# Patient Record
Sex: MALE | Race: WHITE | Employment: FULL TIME | URBAN - METROPOLITAN AREA
[De-identification: names, ages, dates, MRNs, and addresses within clinical notes are randomized per-mention and may not be internally consistent; named-entity substitution may affect disease eponyms.]

---

## 2017-01-20 PROCEDURE — 81001 URINALYSIS AUTO W/SCOPE: CPT | Performed by: FAMILY MEDICINE

## 2017-02-20 ENCOUNTER — OCC HEALTH (OUTPATIENT)
Dept: OCCUPATIONAL MEDICINE | Age: 62
End: 2017-02-20
Attending: PHYSICIAN ASSISTANT

## 2018-01-13 PROCEDURE — 81001 URINALYSIS AUTO W/SCOPE: CPT | Performed by: FAMILY MEDICINE

## 2019-03-18 PROBLEM — Z00.00 ROUTINE GENERAL MEDICAL EXAMINATION AT A HEALTH CARE FACILITY: Status: ACTIVE | Noted: 2019-03-18

## 2019-03-18 PROBLEM — I70.0 ATHEROSCLEROSIS OF AORTA: Status: ACTIVE | Noted: 2019-03-18

## 2019-03-18 PROBLEM — K40.90 RIGHT INGUINAL HERNIA: Status: ACTIVE | Noted: 2019-03-18

## 2019-03-18 PROBLEM — F17.290 CIGAR SMOKER: Status: ACTIVE | Noted: 2019-03-18

## 2019-03-18 PROBLEM — R91.8 PULMONARY NODULES: Status: ACTIVE | Noted: 2019-03-18

## 2019-03-18 PROBLEM — Z87.891 EX-CIGARETTE SMOKER: Status: ACTIVE | Noted: 2019-03-18

## 2019-03-18 PROBLEM — N52.9 ERECTILE DYSFUNCTION, UNSPECIFIED ERECTILE DYSFUNCTION TYPE: Status: ACTIVE | Noted: 2019-03-18

## 2019-03-18 PROBLEM — E78.5 DYSLIPIDEMIA: Status: ACTIVE | Noted: 2019-03-18

## 2019-03-18 PROBLEM — I70.0 ATHEROSCLEROSIS OF AORTA (HCC): Status: ACTIVE | Noted: 2019-03-18

## 2019-06-22 PROCEDURE — 84403 ASSAY OF TOTAL TESTOSTERONE: CPT | Performed by: FAMILY MEDICINE

## 2019-06-22 PROCEDURE — 36415 COLL VENOUS BLD VENIPUNCTURE: CPT | Performed by: FAMILY MEDICINE

## 2019-06-22 PROCEDURE — 84402 ASSAY OF FREE TESTOSTERONE: CPT | Performed by: FAMILY MEDICINE

## 2019-09-10 PROBLEM — R93.1 AGATSTON CAC SCORE, >400: Status: ACTIVE | Noted: 2019-09-10

## 2020-12-16 PROBLEM — D12.2 ADENOMATOUS POLYP OF ASCENDING COLON: Status: ACTIVE | Noted: 2020-12-16

## 2021-10-27 PROBLEM — Z00.00 MEDICARE ANNUAL WELLNESS VISIT, SUBSEQUENT: Status: ACTIVE | Noted: 2021-10-27

## 2021-10-29 ENCOUNTER — HOSPITAL ENCOUNTER (OUTPATIENT)
Dept: GENERAL RADIOLOGY | Age: 66
Discharge: HOME | End: 2021-10-29
Attending: PHYSICIAN ASSISTANT

## 2021-10-29 ENCOUNTER — OCC HEALTH (OUTPATIENT)
Dept: OCCUPATIONAL MEDICINE | Age: 66
End: 2021-10-29
Attending: PHYSICIAN ASSISTANT

## 2021-10-29 DIAGNOSIS — Z00.00 PHYSICAL EXAM: Primary | ICD-10-CM

## 2021-10-29 DIAGNOSIS — Z00.00 PHYSICAL EXAM: ICD-10-CM

## 2021-10-29 PROCEDURE — 72110 X-RAY EXAM L-2 SPINE 4/>VWS: CPT | Performed by: PHYSICIAN ASSISTANT

## 2021-11-01 PROBLEM — M47.817 SPONDYLOSIS OF LUMBOSACRAL REGION WITHOUT MYELOPATHY OR RADICULOPATHY: Status: ACTIVE | Noted: 2021-11-01

## 2025-07-07 ENCOUNTER — HOSPITAL ENCOUNTER (OUTPATIENT)
Dept: INTERVENTIONAL RADIOLOGY/VASCULAR | Facility: HOSPITAL | Age: 70
Discharge: HOME | End: 2025-07-07
Attending: INTERNAL MEDICINE | Admitting: INTERNAL MEDICINE
Payer: MEDICARE

## 2025-07-07 ENCOUNTER — ANESTHESIA EVENT (OUTPATIENT)
Dept: INTERVENTIONAL RADIOLOGY/VASCULAR | Facility: HOSPITAL | Age: 70
End: 2025-07-07
Payer: MEDICARE

## 2025-07-07 VITALS
HEIGHT: 70 IN | BODY MASS INDEX: 25.72 KG/M2 | OXYGEN SATURATION: 98 % | SYSTOLIC BLOOD PRESSURE: 140 MMHG | TEMPERATURE: 98 F | DIASTOLIC BLOOD PRESSURE: 82 MMHG | RESPIRATION RATE: 17 BRPM | WEIGHT: 179.63 LBS | HEART RATE: 65 BPM

## 2025-07-07 DIAGNOSIS — I48.0 PAF (PAROXYSMAL ATRIAL FIBRILLATION) (HCC): ICD-10-CM

## 2025-07-07 DIAGNOSIS — Z01.818 PRE-OP TESTING: Primary | ICD-10-CM

## 2025-07-07 LAB
ATRIAL RATE: 70 BPM
ISTAT ACTIVATED CLOTTING TIME: 273 SECONDS (ref 125–137)
P AXIS: 76 DEGREES
P-R INTERVAL: 176 MS
Q-T INTERVAL: 406 MS
QRS DURATION: 102 MS
QTC CALCULATION (BEZET): 438 MS
R AXIS: 40 DEGREES
T AXIS: 61 DEGREES
VENTRICULAR RATE: 70 BPM

## 2025-07-07 PROCEDURE — 93657 TX L/R ATRIAL FIB ADDL: CPT | Performed by: INTERNAL MEDICINE

## 2025-07-07 PROCEDURE — 36415 COLL VENOUS BLD VENIPUNCTURE: CPT

## 2025-07-07 PROCEDURE — 93005 ELECTROCARDIOGRAM TRACING: CPT

## 2025-07-07 PROCEDURE — 93656 COMPRE EP EVAL ABLTJ ATR FIB: CPT | Performed by: INTERNAL MEDICINE

## 2025-07-07 PROCEDURE — 93010 ELECTROCARDIOGRAM REPORT: CPT | Performed by: INTERNAL MEDICINE

## 2025-07-07 PROCEDURE — 85347 COAGULATION TIME ACTIVATED: CPT

## 2025-07-07 PROCEDURE — 93655 ICAR CATH ABLTJ DSCRT ARRHYT: CPT | Performed by: INTERNAL MEDICINE

## 2025-07-07 RX ORDER — PROTAMINE SULFATE 10 MG/ML
INJECTION, SOLUTION INTRAVENOUS AS NEEDED
Status: DISCONTINUED | OUTPATIENT
Start: 2025-07-07 | End: 2025-07-07 | Stop reason: SURG

## 2025-07-07 RX ORDER — ONDANSETRON 2 MG/ML
INJECTION INTRAMUSCULAR; INTRAVENOUS AS NEEDED
Status: DISCONTINUED | OUTPATIENT
Start: 2025-07-07 | End: 2025-07-07 | Stop reason: SURG

## 2025-07-07 RX ORDER — SODIUM CHLORIDE, SODIUM LACTATE, POTASSIUM CHLORIDE, CALCIUM CHLORIDE 600; 310; 30; 20 MG/100ML; MG/100ML; MG/100ML; MG/100ML
INJECTION, SOLUTION INTRAVENOUS CONTINUOUS PRN
Status: DISCONTINUED | OUTPATIENT
Start: 2025-07-07 | End: 2025-07-07 | Stop reason: SURG

## 2025-07-07 RX ORDER — MORPHINE SULFATE 2 MG/ML
2 INJECTION, SOLUTION INTRAMUSCULAR; INTRAVENOUS EVERY 10 MIN PRN
Status: DISCONTINUED | OUTPATIENT
Start: 2025-07-07 | End: 2025-07-07

## 2025-07-07 RX ORDER — NALOXONE HYDROCHLORIDE 0.4 MG/ML
0.08 INJECTION, SOLUTION INTRAMUSCULAR; INTRAVENOUS; SUBCUTANEOUS AS NEEDED
Status: DISCONTINUED | OUTPATIENT
Start: 2025-07-07 | End: 2025-07-07

## 2025-07-07 RX ORDER — MORPHINE SULFATE 10 MG/ML
6 INJECTION, SOLUTION INTRAMUSCULAR; INTRAVENOUS EVERY 10 MIN PRN
Status: DISCONTINUED | OUTPATIENT
Start: 2025-07-07 | End: 2025-07-07

## 2025-07-07 RX ORDER — MORPHINE SULFATE 4 MG/ML
4 INJECTION, SOLUTION INTRAMUSCULAR; INTRAVENOUS EVERY 10 MIN PRN
Status: DISCONTINUED | OUTPATIENT
Start: 2025-07-07 | End: 2025-07-07

## 2025-07-07 RX ORDER — MORPHINE SULFATE 4 MG/ML
2 INJECTION, SOLUTION INTRAMUSCULAR; INTRAVENOUS EVERY 10 MIN PRN
Status: DISCONTINUED | OUTPATIENT
Start: 2025-07-07 | End: 2025-07-07 | Stop reason: HOSPADM

## 2025-07-07 RX ORDER — HEPARIN SODIUM 1000 [USP'U]/ML
INJECTION, SOLUTION INTRAVENOUS; SUBCUTANEOUS AS NEEDED
Status: DISCONTINUED | OUTPATIENT
Start: 2025-07-07 | End: 2025-07-07 | Stop reason: SURG

## 2025-07-07 RX ORDER — MORPHINE SULFATE 4 MG/ML
4 INJECTION, SOLUTION INTRAMUSCULAR; INTRAVENOUS EVERY 10 MIN PRN
Status: DISCONTINUED | OUTPATIENT
Start: 2025-07-07 | End: 2025-07-07 | Stop reason: HOSPADM

## 2025-07-07 RX ORDER — NALOXONE HYDROCHLORIDE 0.4 MG/ML
0.08 INJECTION, SOLUTION INTRAMUSCULAR; INTRAVENOUS; SUBCUTANEOUS AS NEEDED
Status: DISCONTINUED | OUTPATIENT
Start: 2025-07-07 | End: 2025-07-07 | Stop reason: HOSPADM

## 2025-07-07 RX ORDER — MORPHINE SULFATE 10 MG/ML
6 INJECTION, SOLUTION INTRAMUSCULAR; INTRAVENOUS EVERY 10 MIN PRN
Refills: 0 | Status: DISCONTINUED | OUTPATIENT
Start: 2025-07-07 | End: 2025-07-07 | Stop reason: HOSPADM

## 2025-07-07 RX ORDER — SODIUM CHLORIDE 9 MG/ML
INJECTION, SOLUTION INTRAVENOUS CONTINUOUS
Status: DISCONTINUED | OUTPATIENT
Start: 2025-07-07 | End: 2025-07-07

## 2025-07-07 RX ORDER — DEXAMETHASONE SODIUM PHOSPHATE 4 MG/ML
VIAL (ML) INJECTION AS NEEDED
Status: DISCONTINUED | OUTPATIENT
Start: 2025-07-07 | End: 2025-07-07 | Stop reason: SURG

## 2025-07-07 RX ORDER — HYDROMORPHONE HYDROCHLORIDE 1 MG/ML
0.2 INJECTION, SOLUTION INTRAMUSCULAR; INTRAVENOUS; SUBCUTANEOUS EVERY 5 MIN PRN
Status: DISCONTINUED | OUTPATIENT
Start: 2025-07-07 | End: 2025-07-07

## 2025-07-07 RX ORDER — HYDROMORPHONE HYDROCHLORIDE 1 MG/ML
0.4 INJECTION, SOLUTION INTRAMUSCULAR; INTRAVENOUS; SUBCUTANEOUS EVERY 5 MIN PRN
Refills: 0 | Status: DISCONTINUED | OUTPATIENT
Start: 2025-07-07 | End: 2025-07-07 | Stop reason: HOSPADM

## 2025-07-07 RX ORDER — SODIUM CHLORIDE, SODIUM LACTATE, POTASSIUM CHLORIDE, CALCIUM CHLORIDE 600; 310; 30; 20 MG/100ML; MG/100ML; MG/100ML; MG/100ML
INJECTION, SOLUTION INTRAVENOUS CONTINUOUS
Status: DISCONTINUED | OUTPATIENT
Start: 2025-07-07 | End: 2025-07-07

## 2025-07-07 RX ORDER — HYDROMORPHONE HYDROCHLORIDE 1 MG/ML
0.6 INJECTION, SOLUTION INTRAMUSCULAR; INTRAVENOUS; SUBCUTANEOUS EVERY 5 MIN PRN
Refills: 0 | Status: DISCONTINUED | OUTPATIENT
Start: 2025-07-07 | End: 2025-07-07 | Stop reason: HOSPADM

## 2025-07-07 RX ORDER — SODIUM CHLORIDE, SODIUM LACTATE, POTASSIUM CHLORIDE, CALCIUM CHLORIDE 600; 310; 30; 20 MG/100ML; MG/100ML; MG/100ML; MG/100ML
INJECTION, SOLUTION INTRAVENOUS CONTINUOUS
Status: DISCONTINUED | OUTPATIENT
Start: 2025-07-07 | End: 2025-07-07 | Stop reason: HOSPADM

## 2025-07-07 RX ORDER — HYDROMORPHONE HYDROCHLORIDE 1 MG/ML
0.6 INJECTION, SOLUTION INTRAMUSCULAR; INTRAVENOUS; SUBCUTANEOUS EVERY 5 MIN PRN
Status: DISCONTINUED | OUTPATIENT
Start: 2025-07-07 | End: 2025-07-07

## 2025-07-07 RX ORDER — LIDOCAINE HYDROCHLORIDE 20 MG/ML
INJECTION, SOLUTION INFILTRATION; PERINEURAL
Status: COMPLETED
Start: 2025-07-07 | End: 2025-07-07

## 2025-07-07 RX ORDER — HYDROMORPHONE HYDROCHLORIDE 1 MG/ML
0.2 INJECTION, SOLUTION INTRAMUSCULAR; INTRAVENOUS; SUBCUTANEOUS EVERY 5 MIN PRN
Refills: 0 | Status: DISCONTINUED | OUTPATIENT
Start: 2025-07-07 | End: 2025-07-07 | Stop reason: HOSPADM

## 2025-07-07 RX ORDER — HEPARIN SODIUM 1000 [USP'U]/ML
INJECTION, SOLUTION INTRAVENOUS; SUBCUTANEOUS
Status: COMPLETED
Start: 2025-07-07 | End: 2025-07-07

## 2025-07-07 RX ORDER — HYDROMORPHONE HYDROCHLORIDE 1 MG/ML
0.4 INJECTION, SOLUTION INTRAMUSCULAR; INTRAVENOUS; SUBCUTANEOUS EVERY 5 MIN PRN
Status: DISCONTINUED | OUTPATIENT
Start: 2025-07-07 | End: 2025-07-07

## 2025-07-07 RX ORDER — PROTAMINE SULFATE 10 MG/ML
INJECTION, SOLUTION INTRAVENOUS
Status: COMPLETED
Start: 2025-07-07 | End: 2025-07-07

## 2025-07-07 RX ORDER — ROCURONIUM BROMIDE 10 MG/ML
INJECTION, SOLUTION INTRAVENOUS AS NEEDED
Status: DISCONTINUED | OUTPATIENT
Start: 2025-07-07 | End: 2025-07-07 | Stop reason: SURG

## 2025-07-07 RX ORDER — GLYCOPYRROLATE 0.2 MG/ML
INJECTION, SOLUTION INTRAMUSCULAR; INTRAVENOUS AS NEEDED
Status: DISCONTINUED | OUTPATIENT
Start: 2025-07-07 | End: 2025-07-07 | Stop reason: SURG

## 2025-07-07 RX ADMIN — DEXAMETHASONE SODIUM PHOSPHATE 4 MG: 4 MG/ML VIAL (ML) INJECTION at 08:05:00

## 2025-07-07 RX ADMIN — ROCURONIUM BROMIDE 50 MG: 10 INJECTION, SOLUTION INTRAVENOUS at 08:05:00

## 2025-07-07 RX ADMIN — HEPARIN SODIUM 4000 UNITS: 1000 INJECTION, SOLUTION INTRAVENOUS; SUBCUTANEOUS at 08:53:00

## 2025-07-07 RX ADMIN — HEPARIN SODIUM 10000 UNITS: 1000 INJECTION, SOLUTION INTRAVENOUS; SUBCUTANEOUS at 08:38:00

## 2025-07-07 RX ADMIN — SODIUM CHLORIDE, SODIUM LACTATE, POTASSIUM CHLORIDE, CALCIUM CHLORIDE: 600; 310; 30; 20 INJECTION, SOLUTION INTRAVENOUS at 07:52:00

## 2025-07-07 RX ADMIN — GLYCOPYRROLATE 0.2 MG: 0.2 INJECTION, SOLUTION INTRAMUSCULAR; INTRAVENOUS at 08:06:00

## 2025-07-07 RX ADMIN — PROTAMINE SULFATE 50 MG: 10 INJECTION, SOLUTION INTRAVENOUS at 09:00:00

## 2025-07-07 RX ADMIN — ONDANSETRON 4 MG: 2 INJECTION INTRAMUSCULAR; INTRAVENOUS at 08:05:00

## 2025-07-07 RX ADMIN — GLYCOPYRROLATE 0.2 MG: 0.2 INJECTION, SOLUTION INTRAMUSCULAR; INTRAVENOUS at 08:50:00

## 2025-07-07 NOTE — IVS NOTE
DISCHARGE NOTE     Pt is able to sit up and ambulate without difficulty.   Pt voided and tolerated fluids and food.   Procedural site remains dry and intact with good circulation, motion, and sensation.   No signs and symptoms of bleeding/hematoma noted.   IV access removed  Instruction provided, patient/family verbalizes understanding.   Dr. Owusu spoke with patient/family post procedure.     Pt discharge via wheelchair to [facility]       Follow up Appointment: 07/24 with Sangeeta.    New Prescription: restart eliquis tonight at 6pm, then resume BID tomorrow morning.

## 2025-07-07 NOTE — H&P
[facility] H&P  Keisha Owusu MD  ?  Patient: Abhijit Prince  MRN: [id number]     Primary Care Physician: Dr. Brian Novoa MD  Referring Physician : Dr. Donnie Henry MD   Reason for admit: Atrial fibrillation     HPI:  Abhijit Prince is a 69-year-old gentleman with a history of hypertension, dyslipidemia, and symptomatic PAF.  He was recently seen in clinic with several options discussed including consideration for ablation.  The patient wished to proceed and present for such today.        ---------------------------------------------------------    Assessment/plan:    Hypertension  -Lisinopril/HCTZ 10/12.5    Dyslipidemia  - Atorvastatin 20    PAF  -[id number] of at least 2 -Eliquis 5 twice daily (last dose yesterday morning)  -ZIO 3/25 with sinus 85 average.  PAF noted with longest 134 minutes in duration  -Echo 4/25 with preserved LV systolic function and without significant valvular abnormality  -Stress test 7/24 without evidence for ischemia    With regard to the procedure, I went over the risks and benefits of such in detail and answered all questions.  At this time, the patient is agreeable to proceeding.    Thank you for allowing me to participate in the care of your patient.    MD Sangeeta [facility]    -----------------------------------------------------------------------------        ---------------------------------------------------------  Past Medical History[1]  Active Problems:    * No active hospital problems. *          Medications:  .Medications Ordered Prior to Encounter[2]  Allergies: Allergies[3]     Physical Exam:  Temperature 97.7 °F (36.5 °C), temperature source Temporal, height 5' 10\" (1.778 m), weight 179 lb 9.6 oz (81.5 kg).  ?  Alert and Oriented times x 3, pleasant, no distress, conversant, appears stated age  No JVD or No carotid bruits  Lungs are CTAB, no wheezes or crackles, normal respiratory effort  Heart exam is regular,  no murmurs or S3, PMI is non-displaced  Abdomen is soft, nontender, bowel sounds are present, no HSM, no bruits  Lower extremities without edema, no clubbing  1+ pedal and femoral pulses bilaterally  Normal skin turgor, texture, no rashes or lesions?  Normal muscle tone and gait  .  LABS:  .  Lab Results   Component Value Date    WBC 9.34 10/27/2021    HGB 15.7 10/27/2021    HCT 48.6 10/27/2021    MCV 85.7 10/27/2021     10/27/2021     .  Lab Results   Component Value Date    BUN 9.0 10/27/2021     10/27/2021    K 3.84 10/27/2021     10/27/2021    CO2 23.7 10/27/2021     .No results found for: \"INR\", \"PROTIME\"  .No components found for: \"CHLPL\"  Lab Results   Component Value Date    HDL 40 10/27/2021    HDL 43 09/19/2020    HDL 47.7 (L) 12/21/2019     Lab Results   Component Value Date    LDL 47 10/27/2021    LDL 41 09/19/2020    LDL 38 12/21/2019     Lab Results   Component Value Date    TRIG 90.00 10/27/2021    TRIG 42.00 09/19/2020    TRIG 54.00 12/21/2019     Lab Results   Component Value Date    CHOLHDL 3 10/27/2021    CHOLHDL 2 09/19/2020    CHOLHDL 2 12/21/2019     .  Lab Results   Component Value Date    ALT 30 10/27/2021    AST 25 10/27/2021     .  Lab Results   Component Value Date    TSH 0.540 10/04/2013        Thank you for allowing me to participate in the care of your patient.    Keisha Owusu MD  [facility]            [1]   Past Medical History:   Adenomatous polyp of ascending colon    Colonoscopy Dec 2020: ascending -tubular adenoma                                         Transverse -sessile serrated    Agatston CAC score, >400    Cardiac calcium score =1208 (Sep 2019)    Essential hypertension    Spondylosis of lumbosacral region without myelopathy or radiculopathy    Oct 2021   [2]   No current facility-administered medications on file prior to encounter.     Current Outpatient Medications on File Prior to Encounter   Medication Sig Dispense Refill     apixaban 5 MG Oral Tab Take 1 tablet (5 mg total) by mouth 2 (two) times daily.      lisinopril-hydroCHLOROthiazide 10-12.5 MG Oral Tab Take 1 tablet by mouth daily. 90 tablet 3    atorvastatin 20 MG Oral Tab Take 1 tablet (20 mg total) by mouth every evening. 90 tablet 3    aspirin 81 MG Oral Tab Take 1 tablet (81 mg total) by mouth in the morning.      Cyanocobalamin (B-12) 2000 MCG Oral Tab Take 1 tablet (2,000 mcg total) by mouth in the morning.      Multiple Vitamins-Minerals (MULTIVITAMIN OR) Take by mouth.      Cholecalciferol (VITAMIN D3) 13022 UNITS Oral Cap Take 1 tablet by mouth. (Patient not taking: Reported on 6/25/2025)     [3]   Allergies  Allergen Reactions    Milk-Related Compounds DIARRHEA

## 2025-07-07 NOTE — DISCHARGE INSTRUCTIONS
Discharge Instruction for Cardiac Ablation     Special Instructions  Drink plenty of fluids during the next 24 hours to \"flush\" the contrast from your system  Keep the bandage clean and dry. After 24 hours, you must remove the bandage  Shower after removing the bandage, and wash the procedure site gently with soap and water  Do NOT apply any powders, ointments or creams to the site for 1 week.   DO NOT submerge the procedure site for 1 week (no bath tubs or pools)  If you choose to wear a bandage for a few days, make sure it remains clean and dry and that it is changed daily    Activity  DO NOT drive after the procedure.  You may resume driving late the following day according to the nurse or physician's instructions  Plan on resting and relaxing tonight and tomorrow  Resume your normal activity after 48 hours, or as instructed by your physician  Do not lift anything over 10 pounds for 1 week  Avoid repeated stair use or excessive walking for the first 24 hours.  Avoid repetitive squatting/bending motions/exercises for 1 week.   Avoid drinking alcohol for the next 24 hours. Do not sign legal documents      What is Normal?  A small lump at the procedure site associated with mild tenderness when touched  The procedure site may be bruised or discolored  There may be a small amount of drainage on the bandage    When you should NOTIFY YOUR PHYSICIAN  Bleeding can occur at the procedure site - both on the outside of the skin and/or beneath the surface of the skin  Swelling or a large lump at the procedure site can occur, which may be accompanied by moderate to severe pain    If either of the above occurs, lie down flat.    Have someone apply pressure to the procedure site with both hands, as instructed by the nurse.    Hold pressure for 20 minutes and the bleeding should stop.    Notify your physician of the occurrence  If the bleeding does not stop, call 911 and continue to apply pressure    If you experience signs of a  fever, temperature > 101°, chills, infection (redness, swelling, thick yellow drainage, or a foul odor from the procedure site)  If you notice any numbness, tingling, or loss of feeling to your leg or foot or groin access      You may resume your present diet, unless otherwise specified by your physician.  A list of your medications was provided to you at discharge.    Your physician used a closure device to close the incision:  Type of closure device used: Vascade  See appropriate handout, if applicable

## 2025-07-07 NOTE — ANESTHESIA PROCEDURE NOTES
Airway  Date/Time: 7/7/2025 8:06 AM  Reason: elective    Airway not difficult    General Information and Staff   Patient location during procedure: OR  Anesthesiologist: Malik Quintanilla MD  Performed: anesthesiologist   Performed by: Malik Quintanilla MD  Authorized by: Malik Quintanilla MD        Indications and Patient Condition  Indications for airway management: anesthesia  Sedation level: deep      Preoxygenated: yesPatient position: sniffing  MILS maintained throughout    Mask difficulty assessment: 1 - vent by mask  Planned trial extubation    Final Airway Details    Final airway type: endotracheal airway    Successful airway: ETT  Cuffed: yes   Successful intubation technique: direct laryngoscopy  Facilitating devices/methods: intubating stylet and cricoid pressure  Endotracheal tube insertion site: oral  Blade: Joselyn  Blade size: #3  ETT size (mm): 7.5    Cormack-Lehane Classification: grade I - full view of glottis  Placement verified by: capnometry   Measured from: teeth  ETT to teeth (cm): 22  Number of attempts at approach: 1  Number of other approaches attempted: 0

## 2025-07-07 NOTE — ANESTHESIA POSTPROCEDURE EVALUATION
Patient: Abhijit Prince    Procedure Summary       Date: 07/07/25 Room / Location: [facility]    Anesthesia Start: 0752 Anesthesia Stop:     Procedure: EP PULMONARY VEIN/A-FIB ABLATION Diagnosis:       PAF (paroxysmal atrial fibrillation) (HCC)      PAF (paroxysmal atrial fibrillation) (HCC)    Scheduled Providers: Keisha Owusu MD; Malik Quintanilla MD Anesthesiologist: Malik Quintanilla MD    Anesthesia Type: general ASA Status: 3            Anesthesia Type: general    Vitals Value Taken Time   /83 07/07/25 09:36   Temp 97.7 °F (36.5 °C) 07/07/25 09:25   Pulse 78 07/07/25 09:42   Resp 12 07/07/25 09:42   SpO2 93 % 07/07/25 09:42   Vitals shown include unfiled device data.    [facility] AN Post Evaluation:   Patient Evaluated in PACU  Patient Participation: complete - patient participated  Level of Consciousness: awake  Pain Management: adequate  Airway Patency:patent  Dental exam unchanged from preop  Yes    Cardiovascular Status: acceptable  Respiratory Status: acceptable  Postoperative Hydration acceptable      Malik Quintanilla MD  7/7/2025 9:43 AM

## 2025-07-07 NOTE — ANESTHESIA PREPROCEDURE EVALUATION
Anesthesia PreOp Note    HPI:     Abhijit Prince is a 69 year old male who presents for preoperative consultation requested by: * No surgeons listed *    Date of Surgery: 7/7/2025    * No procedures listed *  Indication: * No pre-op diagnosis entered *    Relevant Problems   No relevant active problems       NPO:  Last Liquid Consumption Date: 07/07/25  Last Liquid Consumption Time: 0430  Last Solid Consumption Date: 07/06/25  Last Solid Consumption Time: 1630  Last Liquid Consumption Date: 07/07/25          History Review:  Patient Active Problem List    Diagnosis Date Noted    Spondylosis of lumbosacral region without myelopathy or radiculopathy 11/01/2021    Medicare annual wellness visit, subsequent 10/27/2021    Adenomatous polyp of ascending colon 12/16/2020    Agatston CAC score, >400 09/10/2019    Right inguinal hernia 03/18/2019    Atherosclerosis of aorta 03/18/2019    Pulmonary nodules 03/18/2019    Cigar smoker 03/18/2019    Ex-cigarette smoker 03/18/2019    Erectile dysfunction, unspecified erectile dysfunction type 03/18/2019    Dyslipidemia 03/18/2019    Essential hypertension 01/29/2016    Abdominal pain, unspecified abdominal location 10/26/2015    Cataract 05/16/2014    Vitamin D deficiency 03/03/2014       Past Medical History[1]    Past Surgical History[2]    Prescriptions Prior to Admission[3]  Current Medications and Prescriptions Ordered in Epic[4]    Allergies[5]    Family History[6]  Social Hx on file[7]    Available pre-op labs reviewed.             Vital Signs:  Body mass index is 25.77 kg/m².   height is 1.778 m (5' 10\") and weight is 81.5 kg (179 lb 9.6 oz). His temporal temperature is 97.7 °F (36.5 °C).   Vitals:    06/25/25 1702 07/07/25 0632 07/07/25 0643   Temp:   97.7 °F (36.5 °C)   TempSrc:   Temporal   Weight: 79.8 kg (176 lb) 81.5 kg (179 lb 9.6 oz)    Height: 1.778 m (5' 10\")          Anesthesia Evaluation     Patient summary reviewed and Nursing notes reviewed    Airway    Mallampati: II  TM distance: >3 FB  Neck ROM: full  Dental      Pulmonary - normal exam     ROS comment: Heavy cigar smoking  Cardiovascular - normal exam  (+) hypertension, dysrhythmias    Neuro/Psych - negative ROS     GI/Hepatic/Renal - negative ROS     Endo/Other    Abdominal                  Anesthesia Plan:   ASA:  3  Plan:   General  Airway:  ETT  Informed Consent Plan and Risks Discussed With:  Patient  Discussed plan with:  Surgeon      I have informed Abhijit Prince and/or legal guardian or family member of the nature of the anesthetic plan, benefits, risks including possible dental damage if relevant, major complications, and any alternative forms of anesthetic management.   All of the patient's questions were answered to the best of my ability. The patient desires the anesthetic management as planned.  Malik Quintanilla MD  7/7/2025 7:31 AM  Present on Admission:  **None**           [1]   Past Medical History:   Adenomatous polyp of ascending colon    Colonoscopy Dec 2020: ascending -tubular adenoma                                         Transverse -sessile serrated    Agatston CAC score, >400    Cardiac calcium score =1208 (Sep 2019)    Essential hypertension    Spondylosis of lumbosacral region without myelopathy or radiculopathy    Oct 2021   [2]   Past Surgical History:  Procedure Laterality Date    Colonoscopy & polypectomy  10/15    polyps; hemorrhoids; tics; repeat 5 yrs (adenoma, hyperplastic)    Colonoscopy & polypectomy  12/2020    adenoma; SSA- repeat 5 yrs    Colonoscopy,biopsy N/A 10/29/2015    Procedure: COLONOSCOPY, POSSIBLE BIOPSY, POSSIBLE POLYPECTOMY 25853;  Surgeon: Cleveland Musa MD;  Location: [facility]    Colonoscopy,remv lesn,snare N/A 10/29/2015    Procedure: COLONOSCOPY, POSSIBLE BIOPSY, POSSIBLE POLYPECTOMY 15115;  Surgeon: Cleveland Musa MD;  Location: [facility]    Hernia surgery  05/11/2020    RT inguinal hernia    Inguinal  hernia repair  05/2020    Right    Laparoscopic repair of initial Left 11/13/2015    Procedure: LAPAROSCOPIC INGUINAL HERNIORRAPHY;  Surgeon: Dwayne Mendenhall MD;  Location: [facility]    Repair umbilical feliciano,5+y/o,reduc Left 11/13/2015    Procedure: LAPAROSCOPIC INGUINAL HERNIORRAPHY;  Surgeon: Dwayne Mendenhall MD;  Location: [facility]   [3]   Medications Prior to Admission   Medication Sig Dispense Refill Last Dose/Taking    apixaban 5 MG Oral Tab Take 1 tablet (5 mg total) by mouth 2 (two) times daily.   7/6/2025 at  7:30 AM    lisinopril-hydroCHLOROthiazide 10-12.5 MG Oral Tab Take 1 tablet by mouth daily. 90 tablet 3 7/6/2025 Morning    atorvastatin 20 MG Oral Tab Take 1 tablet (20 mg total) by mouth every evening. 90 tablet 3 7/6/2025 Evening    aspirin 81 MG Oral Tab Take 1 tablet (81 mg total) by mouth in the morning.   7/6/2025 Morning    Cyanocobalamin (B-12) 2000 MCG Oral Tab Take 1 tablet (2,000 mcg total) by mouth in the morning.   7/6/2025 Noon    Multiple Vitamins-Minerals (MULTIVITAMIN OR) Take by mouth.   7/6/2025 Noon    Cholecalciferol (VITAMIN D3) 73351 UNITS Oral Cap Take 1 tablet by mouth. (Patient not taking: Reported on 6/25/2025)   Not Taking   [4]   Current Facility-Administered Medications Ordered in Epic   Medication Dose Route Frequency Provider Last Rate Last Admin    sodium chloride 0.9% infusion   Intravenous Continuous Keisha Owusu MD         No current [facility]-ordered outpatient medications on file.   [5]   Allergies  Allergen Reactions    Milk-Related Compounds DIARRHEA   [6]   Family History  Problem Relation Age of Onset    Hypertension Mother     Heart Disorder Mother     Other (Other) Mother         cva age 70    Other (Other) Sister         MS    Hypertension Maternal Grandmother     Hypertension Maternal Grandfather    [7]   Social History  Socioeconomic History    Marital status:     Number of children: 4    Highest education level: 1st grade    Occupational History    Occupation:      Comment: retired July 2021   Tobacco Use    Smoking status: Every Day     Types: Cigars, Cigarettes    Smokeless tobacco: Never    Tobacco comments:     does not inhale cigars, 4-5 a day   Vaping Use    Vaping status: Never Used   Substance and Sexual Activity    Alcohol use: Yes     Comment: couple beers daily    Drug use: No   Other Topics Concern     Service No    Blood Transfusions No    Caffeine Concern No    Occupational Exposure No    Hobby Hazards No    Sleep Concern No    Stress Concern No    Weight Concern No    Special Diet Yes    Back Care No    Exercise No    Seat Belt Yes

## 2025-07-07 NOTE — PROCEDURES
[facility] REPORT    Patient name: Abhijit Prince  Date of the procedure: 07/07/25  Patient YOB: 1955  Patient age: 69 year old    Electrophysiologist: Keisha Owusu MD  Procedure: Ablation of atrial fibrillation  Referring Physician: Donnie Henry MD   Primary Care Physician: Dr. Brian Novoa MD    Diagnosis: Atrial fibrillation    Indications:  Abhijit Prince is a 69-year-old gentleman with a history of hypertension, dyslipidemia, and symptomatic PAF.  He was recently seen in clinic with several options discussed including consideration for ablation.  The patient wished to proceed and present for such today.    Labs and allergies are reviewed.  No results for input(s): \"GLU\", \"BUN\", \"CREATSERUM\", \"GFRAA\", \"GFRNAA\", \"EGFRCR\", \"CA\", \"NA\", \"K\", \"CL\", \"CO2\" in the last 168 hours.  No results for input(s): \"RBC\", \"HGB\", \"HCT\", \"MCV\", \"MCH\", \"MCHC\", \"RDW\", \"NEPRELIM\", \"WBC\", \"PLT\" in the last 168 hours.  No results found for: \"PT\", \"INR\"    Allergies[1]    ---------------    Informed consent:  Informed consent was obtained at the time of discussion with the patient, including the risks, benefits, potential complications, and alternative options associated with the planned procedure and possible use of blood products.      Procedure:    The procedure was performed under general anesthesia (please see additional notes).        Transesophageal echocardiogram was not performed as the patient has been maintained on uninterrupted anticoagulation for the past 3+ weeks and presented in sinus as well today.    At that point, groin access was obtained using micropuncture technique utilizing both right and left femoral veins (see catheter list).    Heparin was administered to maintain ACT of 300-350.    Intracardiac ultrasound probe was introduced via left femoral vein through a long 10 [place] sheath and was used to obtain baseline images of the pericardial space as well as for  identification of cardiac structures.    A [employer] VersaCross transseptal assembly sheath was then introduced into the right femoral vein into the region of the SVC.  It was then dragged down in the UMESH view until it was seen to come into contact with the interatrial septum on intracardiac ultrasound.    A small puncture was made in the interatrial septum with application of RF in the wire was then advanced into the left upper pulmonary vein.  The entire assembly was then slowly advanced across the septum to the left atrium under fluoroscopic and echocardiographic guidance.  The wire and dilator were then removed and the system flushed.    The FaraPulse ablation catheter was advanced over a Rangel wire into the sheath and the system again flushed.  The ablation catheter along with the wire was further advanced into the left atrium region into the left upper pulmonary vein.  The catheter was positioned in a basket configuration and applications of energy were delivered once and 2 additional lesions appropriate position within the pulmonary vein ostium was noted.  Were applied following rotation of 10 to 15 degrees.  The catheter was then placed in a flower configuration with similar applications of energy applied.    The FaraPulse catheter was used to similarly apply energy in the remaining 3 veins.  Given history of persistent atrial fibrillation, additional applications were made along the left atrial roof line along with posterior wall ablation.    At that point, the catheter was retracted into the sheath and the system retracted into the right atrium.    SVC isolation was performed.    Protamine was then administered (please see separate notes regarding dosing).    Sheaths and catheters were then removed with hemostasis achieved via application of Vascade along with manual pressure as needed.    The patient remained stable and was extubated with further monitoring and recovery.    Estimated blood loss:  Minimal  Following procedure related complications: None apparent    Summary:  PVI utilizing PFA along with additional linear ablation along the roof line of the left atrium as well as posterior wall.    SVC isolation    Summary of recommendations:  1.  Bedrest for 3 hours with bilateral groin checks per protocol  2.  Post procedural ECG   3.  Resume anticoagulation this afternoon/evening with normal dosing to follow starting tomorrow  4.  No heavy lifting for 1 week  5.  Follow-up with Dr. Owusu 2 weeks following discharge    Thank you for allowing me to participate in the care of your patient.    Keisha Owusu MD  [facility]         [1]   Allergies  Allergen Reactions    Milk-Related Compounds DIARRHEA

## (undated) NOTE — LETTER
[facility]  Administration of Anesthesia  IAbhijitagher agree to be cared for by a physician anesthesiologist alone and/or with a nurse anesthetist, who is specially trained to monitor me and give me medicine to put me to sleep or keep me comfortable during my procedure    I understand that my anesthesiologist and/or anesthetist is not an employee or agent of [facility] or [employer]. He or she works for [facility]    As the patient asking for anesthesia services, I agree to:  Allow the anesthesiologist (anesthesia doctor) to give me medicine and do additional procedures as necessary. Some examples are: Starting or using an “IV” to give me medicine, fluids or blood during my procedure, and having a breathing tube placed to help me breathe when I’m asleep (intubation). In the event that my heart stops working properly, I understand that my anesthesiologist will make every effort to sustain my life, unless otherwise directed by [facility] Do Not Resuscitate documents.  Tell my anesthesia doctor before my procedure:  If I am pregnant.  The last time that I ate or drank.  iii. All of the medicines I take (including prescriptions, herbal supplements, and pills I can buy without a prescription (including street drugs/illegal medications). Failure to inform my anesthesiologist about these medicines may increase my risk of anesthetic complications.  iv.If I am allergic to anything or have had a reaction to anesthesia before.  I understand how the anesthesia medicine will help me (benefits).  I understand that with any type of anesthesia medicine there are risks:  The most common risks are: nausea, vomiting, sore throat, muscle soreness, damage to my eyes, mouth, or teeth (from breathing tube placement).  Rare risks include: remembering what happened during my procedure, allergic reactions to medications, injury to my airway, heart, lungs, vision, nerves, or  muscles and in extremely rare instances death.  My doctor has explained to me other choices available to me for my care (alternatives).  Pregnant Patients (“epidural”):  I understand that the risks of having an epidural (medicine given into my back to help control pain during labor), include itching, low blood pressure, difficulty urinating, headache or slowing of the baby’s heart. Very rare risks include infection, bleeding, seizure, irregular heart rhythms and nerve injury.  Regional Anesthesia (“spinal”, “epidural”, & “nerve blocks”):  I understand that rare but potential complications include headache, bleeding, infection, seizure, irregular heart rhythms, and nerve injury.    _____________________________________________________________________________  Patient (or Representative) Signature/Relationship to Patient  Date   Time    _____________________________________________________________________________   Name (if used)    Language/Organization   Time    _____________________________________________________________________________  Nurse Anesthetist Signature     Date   Time  _____________________________________________________________________________  Anesthesiologist Signature     Date   Time  I have discussed the procedure and information above with the patient (or patient’s representative) and answered their questions. The patient or their representative has agreed to have anesthesia services.    _____________________________________________________________________________  Witness        Date   Time  I have verified that the signature is that of the patient or patient’s representative, and that it was signed before the procedure  Patient Name: Abhijit Prince     : 1955                 Printed: 2025 at 1:35 PM    Medical Record #: [id number]                                            Page 1 of 1  ----------ANESTHESIA CONSENT----------